# Patient Record
Sex: FEMALE | Race: ASIAN | NOT HISPANIC OR LATINO | Employment: OTHER | URBAN - METROPOLITAN AREA
[De-identification: names, ages, dates, MRNs, and addresses within clinical notes are randomized per-mention and may not be internally consistent; named-entity substitution may affect disease eponyms.]

---

## 2018-01-24 ENCOUNTER — AMBULATORY - HEALTHEAST (OUTPATIENT)
Dept: NURSING | Facility: CLINIC | Age: 43
End: 2018-01-24

## 2018-05-23 ENCOUNTER — OFFICE VISIT - HEALTHEAST (OUTPATIENT)
Dept: FAMILY MEDICINE | Facility: CLINIC | Age: 43
End: 2018-05-23

## 2018-05-23 DIAGNOSIS — T20.20XD PARTIAL THICKNESS BURN OF FACE, SUBSEQUENT ENCOUNTER: ICD-10-CM

## 2018-05-23 RX ORDER — ACETAMINOPHEN 325 MG/1
650 TABLET ORAL EVERY 6 HOURS PRN
Status: SHIPPED | COMMUNITY
Start: 2018-05-23

## 2018-05-23 ASSESSMENT — MIFFLIN-ST. JEOR: SCORE: 1127.16

## 2019-01-21 ENCOUNTER — OFFICE VISIT - HEALTHEAST (OUTPATIENT)
Dept: FAMILY MEDICINE | Facility: CLINIC | Age: 44
End: 2019-01-21

## 2019-01-21 DIAGNOSIS — Z12.4 SCREENING FOR CERVICAL CANCER: ICD-10-CM

## 2019-01-21 DIAGNOSIS — N89.8 VAGINAL ITCHING: ICD-10-CM

## 2019-01-21 DIAGNOSIS — Z11.3 SCREENING FOR STD (SEXUALLY TRANSMITTED DISEASE): ICD-10-CM

## 2019-01-21 DIAGNOSIS — Z23 NEED FOR IMMUNIZATION AGAINST INFLUENZA: ICD-10-CM

## 2019-01-21 LAB
CLUE CELLS: NORMAL
HIV 1+2 AB+HIV1 P24 AG SERPL QL IA: NEGATIVE
TRICHOMONAS, WET PREP: NORMAL
YEAST, WET PREP: NORMAL

## 2019-01-21 ASSESSMENT — MIFFLIN-ST. JEOR: SCORE: 1059.12

## 2019-01-22 ENCOUNTER — COMMUNICATION - HEALTHEAST (OUTPATIENT)
Dept: FAMILY MEDICINE | Facility: CLINIC | Age: 44
End: 2019-01-22

## 2019-01-22 LAB
C TRACH DNA SPEC QL PROBE+SIG AMP: NEGATIVE
HPV SOURCE: ABNORMAL
HUMAN PAPILLOMA VIRUS 16 DNA: POSITIVE
HUMAN PAPILLOMA VIRUS 18 DNA: NEGATIVE
HUMAN PAPILLOMA VIRUS FINAL DIAGNOSIS: ABNORMAL
HUMAN PAPILLOMA VIRUS OTHER HR: NEGATIVE
N GONORRHOEA DNA SPEC QL NAA+PROBE: NEGATIVE
SPECIMEN DESCRIPTION: ABNORMAL
T PALLIDUM AB SER QL: NEGATIVE

## 2019-01-29 LAB
BKR LAB AP ABNORMAL BLEEDING: NO
BKR LAB AP BIRTH CONTROL/HORMONES: ABNORMAL
BKR LAB AP CERVICAL APPEARANCE: NORMAL
BKR LAB AP GYN ADEQUACY: ABNORMAL
BKR LAB AP GYN INTERPRETATION: ABNORMAL
BKR LAB AP HPV REFLEX: ABNORMAL
BKR LAB AP LMP: ABNORMAL
BKR LAB AP PATIENT STATUS: ABNORMAL
BKR LAB AP PREVIOUS ABNORMAL: NO
BKR LAB AP PREVIOUS NORMAL: 2014
HIGH RISK?: NO
PATH REPORT.COMMENTS IMP SPEC: ABNORMAL
RESULT FLAG (HE HISTORICAL CONVERSION): ABNORMAL

## 2019-01-31 ENCOUNTER — AMBULATORY - HEALTHEAST (OUTPATIENT)
Dept: FAMILY MEDICINE | Facility: CLINIC | Age: 44
End: 2019-01-31

## 2019-01-31 DIAGNOSIS — R87.810 ATYPICAL SQUAMOUS CELL CHANGES OF UNDETERMINED SIGNIFICANCE (ASCUS) ON CERVICAL CYTOLOGY WITH POSITIVE HIGH RISK HUMAN PAPILLOMA VIRUS (HPV): ICD-10-CM

## 2019-01-31 DIAGNOSIS — R87.610 ATYPICAL SQUAMOUS CELLS OF UNDETERMINED SIGNIFICANCE ON CYTOLOGIC SMEAR OF CERVIX (ASC-US): ICD-10-CM

## 2019-01-31 DIAGNOSIS — R87.610 ATYPICAL SQUAMOUS CELL CHANGES OF UNDETERMINED SIGNIFICANCE (ASCUS) ON CERVICAL CYTOLOGY WITH POSITIVE HIGH RISK HUMAN PAPILLOMA VIRUS (HPV): ICD-10-CM

## 2019-02-14 ENCOUNTER — AMBULATORY - HEALTHEAST (OUTPATIENT)
Dept: OBGYN | Facility: CLINIC | Age: 44
End: 2019-02-14

## 2019-02-14 DIAGNOSIS — Z01.818 PRE-PROCEDURAL EXAMINATION: ICD-10-CM

## 2019-02-14 DIAGNOSIS — R87.610 ASCUS WITH POSITIVE HIGH RISK HPV CERVICAL: ICD-10-CM

## 2019-02-14 DIAGNOSIS — R87.810 ASCUS WITH POSITIVE HIGH RISK HPV CERVICAL: ICD-10-CM

## 2019-02-14 LAB
HCG UR QL: NEGATIVE
SP GR UR STRIP: 1.02 (ref 1–1.03)

## 2019-02-14 ASSESSMENT — MIFFLIN-ST. JEOR: SCORE: 1050.05

## 2019-02-19 LAB
LAB AP CHARGES (HE HISTORICAL CONVERSION): NORMAL
PATH REPORT.COMMENTS IMP SPEC: NORMAL
PATH REPORT.COMMENTS IMP SPEC: NORMAL
PATH REPORT.FINAL DX SPEC: NORMAL
PATH REPORT.GROSS SPEC: NORMAL
PATH REPORT.MICROSCOPIC SPEC OTHER STN: NORMAL
PATH REPORT.MICROSCOPIC SPEC OTHER STN: NORMAL
PATH REPORT.RELEVANT HX SPEC: NORMAL
RESULT FLAG (HE HISTORICAL CONVERSION): NORMAL

## 2019-03-22 ENCOUNTER — OFFICE VISIT - HEALTHEAST (OUTPATIENT)
Dept: FAMILY MEDICINE | Facility: CLINIC | Age: 44
End: 2019-03-22

## 2019-03-22 DIAGNOSIS — Z30.430 ENCOUNTER FOR IUD INSERTION: ICD-10-CM

## 2019-03-22 LAB — HCG UR QL: NEGATIVE

## 2019-03-22 ASSESSMENT — MIFFLIN-ST. JEOR: SCORE: 1063.66

## 2019-03-27 ENCOUNTER — OFFICE VISIT - HEALTHEAST (OUTPATIENT)
Dept: FAMILY MEDICINE | Facility: CLINIC | Age: 44
End: 2019-03-27

## 2019-03-27 DIAGNOSIS — Z30.431 IUD CHECK UP: ICD-10-CM

## 2019-03-27 ASSESSMENT — MIFFLIN-ST. JEOR: SCORE: 1072.73

## 2019-03-29 ENCOUNTER — HOSPITAL ENCOUNTER (OUTPATIENT)
Dept: ULTRASOUND IMAGING | Facility: HOSPITAL | Age: 44
Discharge: HOME | End: 2019-03-29
Attending: FAMILY MEDICINE

## 2019-03-29 DIAGNOSIS — Z30.431 IUD CHECK UP: ICD-10-CM

## 2019-04-12 ENCOUNTER — COMMUNICATION - HEALTHEAST (OUTPATIENT)
Dept: SCHEDULING | Facility: CLINIC | Age: 44
End: 2019-04-12

## 2019-04-12 ENCOUNTER — OFFICE VISIT - HEALTHEAST (OUTPATIENT)
Dept: FAMILY MEDICINE | Facility: CLINIC | Age: 44
End: 2019-04-12

## 2019-04-12 DIAGNOSIS — R11.2 NAUSEA AND VOMITING, INTRACTABILITY OF VOMITING NOT SPECIFIED, UNSPECIFIED VOMITING TYPE: ICD-10-CM

## 2019-04-12 LAB
ANION GAP SERPL CALCULATED.3IONS-SCNC: 9 MMOL/L (ref 5–18)
BASOPHILS # BLD AUTO: 0 THOU/UL (ref 0–0.2)
BASOPHILS NFR BLD AUTO: 0 % (ref 0–2)
BUN SERPL-MCNC: 6 MG/DL (ref 8–22)
CALCIUM SERPL-MCNC: 9.6 MG/DL (ref 8.5–10.5)
CHLORIDE BLD-SCNC: 109 MMOL/L (ref 98–107)
CO2 SERPL-SCNC: 23 MMOL/L (ref 22–31)
CREAT SERPL-MCNC: 0.66 MG/DL (ref 0.6–1.1)
EOSINOPHIL # BLD AUTO: 0.6 THOU/UL (ref 0–0.4)
EOSINOPHIL NFR BLD AUTO: 7 % (ref 0–6)
ERYTHROCYTE [DISTWIDTH] IN BLOOD BY AUTOMATED COUNT: 10.3 % (ref 11–14.5)
GFR SERPL CREATININE-BSD FRML MDRD: >60 ML/MIN/1.73M2
GLUCOSE BLD-MCNC: 98 MG/DL (ref 70–125)
HCG SERPL QL: NEGATIVE
HCT VFR BLD AUTO: 38.4 % (ref 35–47)
HGB BLD-MCNC: 12.8 G/DL (ref 12–16)
LYMPHOCYTES # BLD AUTO: 1.1 THOU/UL (ref 0.8–4.4)
LYMPHOCYTES NFR BLD AUTO: 12 % (ref 20–40)
MCH RBC QN AUTO: 32.7 PG (ref 27–34)
MCHC RBC AUTO-ENTMCNC: 33.4 G/DL (ref 32–36)
MCV RBC AUTO: 98 FL (ref 80–100)
MONOCYTES # BLD AUTO: 0.4 THOU/UL (ref 0–0.9)
MONOCYTES NFR BLD AUTO: 4 % (ref 2–10)
NEUTROPHILS # BLD AUTO: 7.5 THOU/UL (ref 2–7.7)
NEUTROPHILS NFR BLD AUTO: 78 % (ref 50–70)
PLATELET # BLD AUTO: 189 THOU/UL (ref 140–440)
PMV BLD AUTO: 9.1 FL (ref 7–10)
POTASSIUM BLD-SCNC: 4 MMOL/L (ref 3.5–5)
RBC # BLD AUTO: 3.92 MILL/UL (ref 3.8–5.4)
SODIUM SERPL-SCNC: 141 MMOL/L (ref 136–145)
WBC: 9.6 THOU/UL (ref 4–11)

## 2019-04-23 ENCOUNTER — OFFICE VISIT - HEALTHEAST (OUTPATIENT)
Dept: FAMILY MEDICINE | Facility: CLINIC | Age: 44
End: 2019-04-23

## 2019-04-23 DIAGNOSIS — H69.92 DYSFUNCTION OF LEFT EUSTACHIAN TUBE: ICD-10-CM

## 2019-04-23 DIAGNOSIS — Z30.431 IUD CHECK UP: ICD-10-CM

## 2019-04-23 RX ORDER — LORATADINE 10 MG/1
10 TABLET ORAL DAILY
Qty: 30 TABLET | Refills: 11 | Status: SHIPPED | OUTPATIENT
Start: 2019-04-23

## 2019-04-23 ASSESSMENT — MIFFLIN-ST. JEOR: SCORE: 1072.73

## 2019-04-25 ENCOUNTER — COMMUNICATION - HEALTHEAST (OUTPATIENT)
Dept: SCHEDULING | Facility: CLINIC | Age: 44
End: 2019-04-25

## 2019-08-07 ENCOUNTER — OFFICE VISIT - HEALTHEAST (OUTPATIENT)
Dept: FAMILY MEDICINE | Facility: CLINIC | Age: 44
End: 2019-08-07

## 2019-08-07 DIAGNOSIS — R21 RASH: ICD-10-CM

## 2019-08-07 ASSESSMENT — MIFFLIN-ST. JEOR: SCORE: 1085.43

## 2019-08-22 ENCOUNTER — AMBULATORY - HEALTHEAST (OUTPATIENT)
Dept: FAMILY MEDICINE | Facility: CLINIC | Age: 44
End: 2019-08-22

## 2019-08-22 DIAGNOSIS — L70.8 OTHER ACNE: ICD-10-CM

## 2019-08-22 RX ORDER — TRETINOIN 0.5 MG/G
CREAM TOPICAL DAILY
Qty: 45 G | Refills: 4 | Status: SHIPPED | OUTPATIENT
Start: 2019-08-22

## 2019-11-04 ENCOUNTER — COMMUNICATION - HEALTHEAST (OUTPATIENT)
Dept: SCHEDULING | Facility: CLINIC | Age: 44
End: 2019-11-04

## 2019-11-07 ENCOUNTER — OFFICE VISIT - HEALTHEAST (OUTPATIENT)
Dept: FAMILY MEDICINE | Facility: CLINIC | Age: 44
End: 2019-11-07

## 2019-11-07 ENCOUNTER — COMMUNICATION - HEALTHEAST (OUTPATIENT)
Dept: TELEHEALTH | Facility: CLINIC | Age: 44
End: 2019-11-07

## 2019-11-07 DIAGNOSIS — H52.13 MYOPIA, BILATERAL: ICD-10-CM

## 2019-11-07 DIAGNOSIS — R20.2 TINGLING: ICD-10-CM

## 2019-11-07 DIAGNOSIS — Z23 NEED FOR VACCINATION: ICD-10-CM

## 2019-11-07 LAB
CHOLEST SERPL-MCNC: 173 MG/DL
FASTING STATUS PATIENT QL REPORTED: NORMAL
HDLC SERPL-MCNC: 51 MG/DL
LDLC SERPL CALC-MCNC: 109 MG/DL
TRIGL SERPL-MCNC: 67 MG/DL
TSH SERPL DL<=0.005 MIU/L-ACNC: 0.61 UIU/ML (ref 0.3–5)
VIT B12 SERPL-MCNC: 289 PG/ML (ref 213–816)

## 2019-11-07 ASSESSMENT — MIFFLIN-ST. JEOR: SCORE: 1099.95

## 2019-11-08 ENCOUNTER — COMMUNICATION - HEALTHEAST (OUTPATIENT)
Dept: FAMILY MEDICINE | Facility: CLINIC | Age: 44
End: 2019-11-08

## 2020-08-21 ENCOUNTER — COMMUNICATION - HEALTHEAST (OUTPATIENT)
Dept: SCHEDULING | Facility: CLINIC | Age: 45
End: 2020-08-21

## 2021-04-15 ENCOUNTER — AMBULATORY - HEALTHEAST (OUTPATIENT)
Dept: NURSING | Facility: CLINIC | Age: 46
End: 2021-04-15

## 2021-05-06 ENCOUNTER — AMBULATORY - HEALTHEAST (OUTPATIENT)
Dept: NURSING | Facility: CLINIC | Age: 46
End: 2021-05-06

## 2021-05-25 ENCOUNTER — RECORDS - HEALTHEAST (OUTPATIENT)
Dept: ADMINISTRATIVE | Facility: CLINIC | Age: 46
End: 2021-05-25

## 2021-05-26 NOTE — PROGRESS NOTES
IUD Insertion Procedure Note    Pre-operative Diagnosis: desires contraception    Post-operative Diagnosis: same    Indications: contraception    Procedure Details   Urine pregnancy test was done today and result was negative.  The risks (including infection, bleeding, pain, and uterine perforation) and benefits of the procedure were explained to the patient and Written informed consent was obtained.      Cervix cleansed with Betadine. Uterus sounded to 7 cm. IUD inserted without difficulty. String visible and trimmed. Patient tolerated procedure well.    IUD Information:  Meena.Lot#: [id number] Exp: 07/2024 Ndc: 11165-284-04    Condition:  Stable    Complications:  None    Plan:    The patient was advised to call for any fever or for prolonged or severe pain or bleeding. She was advised to use OTC ibuprofen as needed for mild to moderate pain.

## 2021-05-27 ENCOUNTER — RECORDS - HEALTHEAST (OUTPATIENT)
Dept: ADMINISTRATIVE | Facility: CLINIC | Age: 46
End: 2021-05-27

## 2021-05-27 NOTE — TELEPHONE ENCOUNTER
Patient had IUD placed on the 22nd of March, 2019  She is c/o   Vomiting, and really weak, and cannot   Hear because of ringing in her ears  She is having a hard time walking without help from another  Twice yesterday and twice  Today.    She has her period at this time, heavier than normal.  Not a lot of cramping , just more bleeding.    Patient was scheduled at [facility] , and then called back and states they may check out the [facility] , and if wait times are not too long, she will go there , and cancel the C appointment.    Sarah Duran RN  [facility] Triage/refill nurse

## 2021-05-27 NOTE — PROGRESS NOTES
ASSESSMENT/PLAN:    1. IUD check up  Patient had change sensation starting last night, then 4 days after ParaGard IUD placement.  Her exam today is normal.  I given her ibuprofen prior to the exam is a assumed likely the IUD had become displaced and was planning on removing it.  However, she has normal physical exam.  However, given that she is having some cramping and this feels significantly different than what was first placed, I recommend checking pelvic ultrasound to ensure that IUD is properly positioned to avoid unintended pregnancy.  - US Pelvis With Transvaginal Non OB; Future  - ibuprofen tablet 600 mg (ADVIL,MOTRIN)      Return in 4 weeks (on 4/23/2019) for with your primary care doctor, Next scheduled appointment.     SUBJECTIVE:  Leti Steele is a 43 y.o. female here concerned about her ParaGard IUD.  Patient had uneventful placement of ParaGard IUD by Dr. Joseph on 3/22/2019 (5 days ago.  Initially things seemed fine, but then starting yesterday she had some discomfort when she was sitting a certain way and it persisted overnight.  While she was in the shower she tried to feel the strings and they seem longer than she remembered and so she was worried that that IUD was becoming misplaced and wanted to be sure to avoid unintentional pregnancy.  She is having a little cramping when she sits a certain way, but is not bad.  No spotting or bleeding.         The following portions of the patient's history were reviewed and updated as appropriate: allergies, current medications and problem list  Current Outpatient Medications on File Prior to Visit   Medication Sig Dispense Refill     acetaminophen (TYLENOL) 325 MG tablet Take 650 mg by mouth every 6 (six) hours as needed for pain.       copper (PARAGARD T 380A UTRN) by Intrauterine route. Lot#: [id number]  Exp: 07/2024  Ndc: 60754-703-93             No current facility-administered medications on file prior to visit.          Social History     Tobacco Use  "  Smoking Status Never Smoker   Smokeless Tobacco Never Used       OBJECTIVE:  :  BP 90/60   Pulse 72   Temp 98.6  F (37  C) (Oral)   Resp 24   Ht 4' 11\" (1.499 m)   Wt 114 lb (51.7 kg)   LMP 03/17/2019   SpO2 96%   BMI 23.03 kg/m    Wt Readings from Last 3 Encounters:   03/27/19 114 lb (51.7 kg)   03/22/19 112 lb (50.8 kg)   02/14/19 109 lb (49.4 kg)         Gen:  A&A, NAD  : Normal female external genitalia.  Hemorrhoids noted.  Speculum exam reveals normal-appearing cervix.  There are white IUD strings coming from the cervical loss that are about 3-4 cm in length.  I do not see the IUD device itself at all coming from the office.    "

## 2021-05-28 ENCOUNTER — RECORDS - HEALTHEAST (OUTPATIENT)
Dept: ADMINISTRATIVE | Facility: CLINIC | Age: 46
End: 2021-05-28
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
## 2021-05-29 ENCOUNTER — RECORDS - HEALTHEAST (OUTPATIENT)
Dept: ADMINISTRATIVE | Facility: CLINIC | Age: 46
End: 2021-05-29

## 2021-05-30 ENCOUNTER — RECORDS - HEALTHEAST (OUTPATIENT)
Dept: ADMINISTRATIVE | Facility: CLINIC | Age: 46
End: 2021-05-30

## 2021-05-31 NOTE — PROGRESS NOTES
"Assessment:       1. Rash         Medical Decision Making  Rash due to unknown cause. Suspected reactive to Paragard IUD. No signs of infection such as fevers, nausea, and/or vomiting. Rash has been affecting the face only for 3 months. Does not appear to be impetigo given lack of honey-colored crusts. No purulent discharge. Also does not appear to be a contact dermatitis with no new soaps, lotions, or detergents.      Plan:       Calamine lotion.  Cold compresses.  Follow-up with PCP for further evaluation and possible IUD removal.  Discussed signs of worsening symptoms and when to return for immediate re-evaluation.       Patient Instructions   You were seen today for a facial rash. It does not appear infectious. Recommend follow-up with primary care for further evaluation.    Recommend trying topical calamine lotion.         Subjective:        Leti Steele is a 44 y.o. female here for evaluation of a rash. Symptoms have been present for 3 months. The rash is located on the face. Since then it has not spread. Parts of the rash will heal and then new bumps will reappear. Patient has tried nothing for initial treatment and the rash has worsened. Discomfort is mild, described as itchiness. Patient does not have a fever. Recent illnesses: none. Sick contacts: none known. New recent exposures to food, detergent, soaps: none. Patient's symptoms began after she had th Paragard IUD inserted and is concerned this is causing her symptoms.  She has friends who have had similar reactions to the same contraceptive. Patient tried making an appointment with her PCP, but the soonest available was 8/19.    Review of Systems  Pertinent items are noted in HPI.    Allergies  Allergies   Allergen Reactions     No Known Drug Allergies           Objective:       BP 96/50 (Patient Site: Right Arm, Patient Position: Sitting, Cuff Size: Adult Regular)   Pulse 64   Temp 98.4  F (36.9  C) (Oral)   Ht 4' 11\" (1.499 m)   Wt 116 lb 12.8 oz " (53 kg)   SpO2 98%   BMI 23.59 kg/m    General appearance: alert, appears stated age, cooperative, no distress and non-toxic   Throat: no tonsil swelling, erythema, or exudate; MMM, lips and tongue normal  Neck: no lymphadenopathy, supple  Skin: papular rash with occasional central scabs that are healing affecting the face; spares the lips, eyes, and ears

## 2021-05-31 NOTE — PATIENT INSTRUCTIONS - HE
You were seen today for a facial rash. It does not appear infectious. Recommend follow-up with primary care for further evaluation.    Recommend trying topical calamine lotion.

## 2021-06-01 VITALS — WEIGHT: 126 LBS | BODY MASS INDEX: 25.4 KG/M2 | HEIGHT: 59 IN

## 2021-06-02 ENCOUNTER — RECORDS - HEALTHEAST (OUTPATIENT)
Dept: ADMINISTRATIVE | Facility: CLINIC | Age: 46
End: 2021-06-02

## 2021-06-02 VITALS — BODY MASS INDEX: 22.38 KG/M2 | HEIGHT: 59 IN | WEIGHT: 111 LBS

## 2021-06-02 VITALS — WEIGHT: 114 LBS | BODY MASS INDEX: 22.98 KG/M2 | HEIGHT: 59 IN

## 2021-06-02 VITALS — WEIGHT: 109 LBS | BODY MASS INDEX: 21.97 KG/M2 | HEIGHT: 59 IN

## 2021-06-02 VITALS — WEIGHT: 112 LBS | BODY MASS INDEX: 22.58 KG/M2 | HEIGHT: 59 IN

## 2021-06-02 VITALS — WEIGHT: 115 LBS | BODY MASS INDEX: 23.23 KG/M2

## 2021-06-03 ENCOUNTER — RECORDS - HEALTHEAST (OUTPATIENT)
Dept: ADMINISTRATIVE | Facility: CLINIC | Age: 46
End: 2021-06-03

## 2021-06-03 VITALS
HEIGHT: 59 IN | OXYGEN SATURATION: 99 % | RESPIRATION RATE: 20 BRPM | HEART RATE: 66 BPM | BODY MASS INDEX: 24.19 KG/M2 | TEMPERATURE: 98 F | WEIGHT: 120 LBS | SYSTOLIC BLOOD PRESSURE: 108 MMHG | DIASTOLIC BLOOD PRESSURE: 70 MMHG

## 2021-06-03 VITALS — HEIGHT: 59 IN | BODY MASS INDEX: 23.55 KG/M2 | WEIGHT: 116.8 LBS
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
## 2021-06-03 NOTE — TELEPHONE ENCOUNTER
Finger and toes on left side are tingling  Whole hand on left side   Has a headache , and all of these symptoms began at midnight last night.  Per protocol, patient was advised to be seen in the ED @ [place].    Sarah Duran RN  [facility] Triage/refill nurse        Reason for Disposition    New neurologic deficit that is present NOW, sudden onset of ANY of the following: * Weakness of the face, arm, or leg on one side of the body * Numbness of the face, arm, or leg on one side of the body * Loss of speech or garbled speech    Headache (with neurologic deficit)    Can't use hand normally (e.g., hold a glass of water)    Back pain with numbness (loss of sensation) in groin or rectal area    Neurologic deficit that was brief (now gone), ANY of the following: * Weakness of the face, arm, or leg on one side of the body * Numbness of the face, arm, or leg on one side of the body * Loss of speech or garbled speech    Neurologic deficit of gradual onset, ANY of the following: * Weakness of the face, arm, or leg on one side of the body * Numbness of the face, arm, or leg on one side of the body * Loss of speech or garbled speech    Tingling (e.g., pins and needles) of the face, arm or leg on one side of the body, that is  present now    Protocols used: NEUROLOGIC DEFICIT-A-OH

## 2021-06-05 ENCOUNTER — RECORDS - HEALTHEAST (OUTPATIENT)
Dept: FAMILY MEDICINE | Facility: CLINIC | Age: 46
End: 2021-06-05

## 2021-06-05 DIAGNOSIS — O48.0 POST TERM PREGNANCY: ICD-10-CM

## 2021-06-10 NOTE — TELEPHONE ENCOUNTER
Rahul, , calling with patient.  Patient reports that at 2 am, she woke up because she had severe chest pain.   Patient reported constant pain and was severe for while but she didn't want to go to the ED due to COVID-19.  Patient says the pain has lessened over time but she still feels the pain intermittent.  Patient rates current chest pain as 3-4/10 and it lasts less than 5 min.  Patient also reports back pain.  Patient says has pain in her left arm sometimes when the pain is present.  Reviewed care advice with caller per RN triage protocol to be evaluated.  Caller verbalized understanding and agrees.      Reason for Disposition    Pain also present in shoulder(s) or arm(s) or jaw  (Exception: pain is clearly made worse by movement)    Additional Information    Negative: Severe difficulty breathing (e.g., struggling for each breath, speaks in single words)    Negative: Difficult to awaken or acting confused (e.g., disoriented, slurred speech)    Negative: Shock suspected (e.g., cold/pale/clammy skin, too weak to stand, low BP, rapid pulse)    Negative: [1] Chest pain lasts > 5 minutes AND [2] history of heart disease  (i.e., heart attack, bypass surgery, angina, angioplasty, CHF; not just a heart murmur)    Negative: [1] Chest pain lasts > 5 minutes AND [2] described as crushing, pressure-like, or heavy    Negative: [1] Chest pain lasts > 5 minutes AND [2] age > 50    Negative: [1] Chest pain lasts > 5 minutes AND [2] age > 30 AND [3] at least one cardiac risk factor (i.e., hypertension, diabetes, obesity, smoker or strong family history of heart disease)    Negative: [1] Chest pain lasts > 5 minutes AND [2] not relieved with nitroglycerin    Negative: Passed out (i.e., lost consciousness, collapsed and was not responding)    Negative: Heart beating < 50 beats per minute OR > 140 beats per minute    Negative: Visible sweat on face or sweat dripping down face    Negative: Sounds like a life-threatening  "emergency to the triager    Negative: Followed a chest injury    Negative: SEVERE chest pain    Negative: [1] Intermittent  chest pain or \"angina\" AND [2] increasing in severity or frequency  (Exception: pains lasting a few seconds)    Protocols used: CHEST PAIN-A-AH      "

## 2021-06-16 PROBLEM — T20.20XA PARTIAL THICKNESS BURN OF FACE: Status: ACTIVE | Noted: 2018-05-23

## 2021-06-16 PROBLEM — L70.9 ACNE: Status: ACTIVE | Noted: 2019-08-22

## 2021-06-16 PROBLEM — R20.2 PARESTHESIAS: Status: ACTIVE | Noted: 2019-11-04

## 2021-06-16 PROBLEM — N87.0 DYSPLASIA OF CERVIX, LOW GRADE (CIN 1): Status: ACTIVE | Noted: 2019-06-07

## 2021-06-16 PROBLEM — Z30.430 ENCOUNTER FOR IUD INSERTION: Status: ACTIVE | Noted: 2019-03-22

## 2021-06-17 NOTE — PATIENT INSTRUCTIONS - HE
Patient Instructions by Xenia Yuan at 4/12/2019  9:50 AM     Author: Xenia Yuan Service: -- Author Type: Medical Student    Filed: 4/12/2019 10:53 AM Encounter Date: 4/12/2019 Status: Addendum    : Xenia Yuan (Medical Student)    Related Notes: Original Note by Xenia Yuan (Medical Student) filed at 4/12/2019 10:48 AM       Patient Education   You have your period which can happen even if you have an IUD. I suspect you also have a viral GI illness. Please review the following information and follow up if symptoms worsen or fail to improve.  Viral Gastroenteritis (Adult)    Gastroenteritis is commonly called the stomach flu. It is most often caused by a virus that affects the stomach and intestinal tract and usually lasts from 2 to 7 days. Common viruses causing gastroenteritis include norovirus, rotavirus, and hepatitis A. Non-viral causes of gastroenteritis include bacteria, parasites, and toxins.  The danger from repeated vomiting or diarrhea is dehydration. This is the loss of too much fluid from the body. When this occurs, body fluids must be replaced. Antibiotics do not help with this illness because it is usually viral.Simple home treatment will be helpful.  Symptoms of viral gastroenteritis may include:    Watery, loose stools    Stomach pain or abdominal cramps    Fever and chills    Nausea and vomiting    Loss of bowel control    Headache  Home care  Gastroenteritis is transmitted by contact with the stool or vomit of an infected person. This can occur from person to person or from contact with a contaminated surface.  Follow these guidelines when caring for yourself at home:    If symptoms are severe, rest at home for the next 24 hours or until you are feeling better.    Wash your hands with soap and water or use alcohol-based  to prevent the spread of infection. Wash your hands after touching anyone who is sick.    Wash your hands or use alcohol-based  after using the  toilet and before meals. Clean the toilet after each use.  Remember these tips when preparing food:    People with diarrhea should not prepare or serve food to others. When preparing foods, wash your hands before and after.    Wash your hands after using cutting boards, countertops, knives, or utensils that have been in contact with raw food.    Keep uncooked meats away from cooked and ready-to-eat foods.  Medicine  You may use acetaminophen or NSAID medicines like ibuprofen or naproxen to control fever unless another medicine was given. If you have chronic liver or kidney disease, talk with your healthcare provider before using these medicines. Also talk with your provider if you've had a stomach ulcer or gastrointestinal bleeding. Don't give aspirin to anyone under 18 years of age who is ill with a fever. It may cause severe liver damage. Don't use NSAIDS is you are already taking one for another condition (like arthritis) or are on aspirin (such as for heart disease or after a stroke).  If medicine for vomiting or diarrhea are prescribed, take these only as directed. Do not take over-the-counter medicines for vomiting or diarrhea unless instructed by your healthcare provider.  Diet  Follow these guidelines for food:    Water and liquids are important so you don't get dehydrated. Drink a small amount at a time or suck on ice chips if you are vomiting.    If you eat, avoid fatty, greasy, spicy, or fried foods.    Don't eat dairy if you have diarrhea. This can make diarrhea worse.    Avoid tobacco, alcohol, and caffeine which may worsen symptoms.  During the first 24 hours (the first full day), follow the diet below:    Beverages. Sports drinks, soft drinks without caffeine, ginger ale, mineral water (plain or flavored), decaffeinated tea and coffee. If you are very dehydrated, sports drinks aren't a good choice. They have too much sugar and not enough electrolytes. In this case, commercially available products  called oral rehydration solutions, are best.    Soups. Eat clear broth, consommé, and bouillon.    Desserts. Eat gelatin, popsicles, and fruit juice bars.  During the next 24 hours (the second day), you may add the following to the above:    Hot cereal, plain toast, bread, rolls, and crackers    Plain noodles, rice, mashed potatoes, chicken noodle or rice soup    Unsweetened canned fruit (avoid pineapple), bananas    Limit fat intake to less than 15 grams per day. Do this by avoiding margarine, butter, oils, mayonnaise, sauces, gravies, fried foods, peanut butter, meat, poultry, and fish.    Limit fiber and avoid raw or cooked vegetables, fresh fruits (except bananas), and bran cereals.    Limit caffeine and chocolate. Don't use spices or seasonings other than salt.    Limit dairy products.    Avoid alcohol.  During the next 24 hours:    Gradually resume a normal diet as you feel better and your symptoms improve.    If at any time it starts getting worse again, go back to clear liquids until you feel better.  Follow-up care  Follow up with your healthcare provider, or as advised. Call your provider if you don't get better within 24 hours or if diarrhea lasts more than a week. Also follow up if you are unable to keep down liquids and get dehydrated. If a stool (diarrhea) sample was taken, call as directed for the results.  Call 911  Call 911 if any of these occur:    Trouble breathing    Chest pain    Confused    Severe drowsiness or trouble awakening    Fainting or loss of consciousness    Rapid heart rate    Seizure    Stiff neck  When to seek medical advice  Call your healthcare provider right away if any of these occur:    Abdominal pain that gets worse    Continued vomiting (unable to keep liquids down)    Frequent diarrhea (more than 5 times a day)    Blood in vomit or stool (black or red color)    Dark urine, reduced urine output, or extreme thirst    Weakness or dizziness    Drowsiness    Fever of 100.4 F  (38 C) or higher, or as directed by your healthcare provider    New rash  Date Last Reviewed: 1/3/2016    1488-4991 [employer], [employer]. [address] [place] [address], [place] [address]. All rights reserved. This information is not intended as a substitute for professional medical care. Always follow your healthcare professional's instructions.
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
## 2021-06-18 NOTE — LETTER
Letter by Desirae Joseph MD at      Author: Desirae Joseph MD Service: -- Author Type: --    Filed:  Encounter Date: 1/22/2019 Status: (Other)               [address]  [place] [address]   PHONE [phone]  [phone]     January 22, 2019  Leti JENNINGS Cedric  [id number] [address] [place] [id number]    Dear Leti:    Below are the results from your recent visit.  Your results are normal.      Resulted Orders   Wet Prep, Vaginal   Result Value Ref Range    Yeast Result No yeast seen No yeast seen    Trichomonas No Trichomonas seen No Trichomonas seen    Clue Cells, Wet Prep No Clue cells seen No Clue cells seen   Chlamydia trachomatis & Neisseria gonorrhoeae, Amplified Detection   Result Value Ref Range    Chlamydia trachomatis, Amplified Detection Negative Negative    Neisseria gonorrhoeae, Amplified Detection Negative Negative   HIV Antigen/Antibody Screening [place]   Result Value Ref Range    HIV Antigen / Antibody Negative Negative    Narrative    Method is Abbott HIV Ag/Ab for the detection of HIV p24 antigen, HIV-1 antibodies and HIV-2 antibodies.   Syphilis Screen, [place]   Result Value Ref Range    Treponema Antibody (Syphilis) Negative Negative         If you have any questions or concerns, please do not hesitate to call.    Sincerely,      Desirae Joseph MD  January 22, 2019

## 2021-06-19 NOTE — LETTER
Letter by Desirae Joseph MD at      Author: Desirae Joseph MD Service: -- Author Type: --    Filed:  Encounter Date: 11/8/2019 Status: Signed               [address]  [place] [address]   PHONE [phone]  [phone]     November 8, 2019  Leti JENNINGS Cedric  [id number] [address] [address]    Dear Leti:    Below are the results from your recent visit.  Your results are normal.      Resulted Orders   Thyroid Cascade   Result Value Ref Range    TSH 0.61 0.30 - 5.00 uIU/mL   Vitamin B12   Result Value Ref Range    Vitamin B-12 289 213 - 816 pg/mL   Lipid Profile   Result Value Ref Range    Triglycerides 67 <=149 mg/dL    Cholesterol 173 <=199 mg/dL    LDL Calculated 109 <=129 mg/dL    HDL Cholesterol 51 >=50 mg/dL    Patient Fasting > 8hrs? Unknown          If you have any questions or concerns, please do not hesitate to call.    Sincerely,      Desirae Joseph MD  November 8, 2019
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
## 2021-06-27 NOTE — PROGRESS NOTES
Progress Notes by Domonique Ashraf NP at 2019  9:50 AM     Author: Domonique Ashraf NP Service: -- Author Type: Nurse Practitioner    Filed: 2019  1:42 PM Encounter Date: 2019 Status: Signed    : Domonique Ashraf NP (Nurse Practitioner)       Subjective:      Patient ID: Leti Steele is a 44 y.o. female.    Chief Complaint:    Patient is a 44 year-old female accompanied by her  with complaints of heavy vaginal bleeding for 3-4 days, lower abdominal cramping, nausea/vomiting since yesterday, weakness and dizziness. Patient has had no sick contacts that she is aware of. She denies fever. She denies cold symptoms. Patient reports she  Has an IUD placed 3 weeks ago and 3 days ago developed vaginal bleeding. She went through 6 pads yesterday and 2 today. She developed nausea and vomiting yesterday with 2 episodes of emesis yesterday and 2 episodes today. She has not had any food and little fluid intake. Patient has abdominal cramping but no constant abdominal pain. She has not taken any thing for these symptoms.         Past Medical History:   Diagnosis Date   ? Lyme disease    ? Oligohydramnios 2016   ? Sprain of sacroiliac ligament        Past Surgical History:   Procedure Laterality Date   ?  SECTION      for fetal anomalies   ? NM  DELIVERY ONLY      Description:  Section;  Recorded: 2014;       Family History   Problem Relation Age of Onset   ? No Medical Problems Mother    ? No Medical Problems Father    ? No Medical Problems Sister    ? No Medical Problems Brother    ? No Medical Problems Daughter    ? No Medical Problems Son    ? No Medical Problems Maternal Aunt    ? No Medical Problems Maternal Uncle    ? No Medical Problems Paternal Aunt    ? No Medical Problems Paternal Uncle    ? No Medical Problems Maternal Grandmother    ? No Medical Problems Maternal Grandfather    ? No Medical Problems Paternal Grandmother    ? No Medical Problems  Paternal Grandfather        Social History     Tobacco Use   ? Smoking status: Never Smoker   ? Smokeless tobacco: Never Used   Substance Use Topics   ? Alcohol use: No   ? Drug use: No     Allergies   Allergen Reactions   ? No Known Drug Allergies        Review of Systems   Constitutional: Positive for activity change, appetite change and fatigue. Negative for chills and fever.   HENT: Negative for ear pain.         Ringing in ears after vomiting   Eyes: Negative.    Respiratory: Negative.    Cardiovascular: Negative.    Gastrointestinal: Positive for nausea.   Genitourinary: Positive for vaginal bleeding.   Neurological: Positive for dizziness, weakness and light-headedness.       Objective:     /72   Pulse 64   Temp 98  F (36.7  C) (Oral)   Resp 18   Wt 115 lb (52.2 kg)   LMP 03/17/2019   SpO2 98%   BMI 23.23 kg/m      Physical Exam   Constitutional: She is oriented to person, place, and time. She appears well-developed.   HENT:   Head: Normocephalic.   Right Ear: Ear canal normal. A middle ear effusion is present.   Left Ear: Hearing, tympanic membrane, external ear and ear canal normal.   Ears:    Eyes: Pupils are equal, round, and reactive to light. Conjunctivae and EOM are normal.   Neck: Normal range of motion.   Cardiovascular: Normal rate, regular rhythm, normal heart sounds and intact distal pulses. Exam reveals no gallop and no friction rub.   No murmur heard.  Pulmonary/Chest: Effort normal. No respiratory distress. She has no wheezes. She has no rales. She exhibits no tenderness.   Abdominal: Soft. She exhibits no distension. There is no tenderness.   Genitourinary: Pelvic exam was performed with patient supine. Cervix exhibits no motion tenderness. There is bleeding in the vagina. No tenderness in the vagina.       Neurological: She is alert and oriented to person, place, and time. No cranial nerve deficit. Coordination normal.   Skin: Skin is warm. No rash noted. No erythema.    Psychiatric: She has a normal mood and affect.       Assessment:   1. Nausea and Vomiting  2. Vaginal bleeding.   Labs CBC and chem-7 were relatively normal. Pregnancy test negative. Menstrual bleeding that is common with IUD. Her IUD was intact since we could visualize the strings on exam. The vomiting is likely a gastrointestinal virus.  Recent Results (from the past 24 hour(s))   Basic Metabolic Panel   Result Value Ref Range    Sodium 141 136 - 145 mmol/L    Potassium 4.0 3.5 - 5.0 mmol/L    Chloride 109 (H) 98 - 107 mmol/L    CO2 23 22 - 31 mmol/L    Anion Gap, Calculation 9 5 - 18 mmol/L    Glucose 98 70 - 125 mg/dL    Calcium 9.6 8.5 - 10.5 mg/dL    BUN 6 (L) 8 - 22 mg/dL    Creatinine 0.66 0.60 - 1.10 mg/dL    GFR MDRD Af Amer >60 >60 mL/min/1.73m2    GFR MDRD Non Af Amer >60 >60 mL/min/1.73m2   HM1 (CBC with Diff)   Result Value Ref Range    WBC 9.6 4.0 - 11.0 thou/uL    RBC 3.92 3.80 - 5.40 mill/uL    Hemoglobin 12.8 12.0 - 16.0 g/dL    Hematocrit 38.4 35.0 - 47.0 %    MCV 98 80 - 100 fL    MCH 32.7 27.0 - 34.0 pg    MCHC 33.4 32.0 - 36.0 g/dL    RDW 10.3 (L) 11.0 - 14.5 %    Platelets 189 140 - 440 thou/uL    MPV 9.1 7.0 - 10.0 fL    Neutrophils % 78 (H) 50 - 70 %    Lymphocytes % 12 (L) 20 - 40 %    Monocytes % 4 2 - 10 %    Eosinophils % 7 (H) 0 - 6 %    Basophils % 0 0 - 2 %    Neutrophils Absolute 7.5 2.0 - 7.7 thou/uL    Lymphocytes Absolute 1.1 0.8 - 4.4 thou/uL    Monocytes Absolute 0.4 0.0 - 0.9 thou/uL    Eosinophils Absolute 0.6 (H) 0.0 - 0.4 thou/uL    Basophils Absolute 0.0 0.0 - 0.2 thou/uL   Beta-hCG, Qualitative, Serum   Result Value Ref Range    Beta hCG Qualitative Negative Negative         Recent Results (from the past 24 hour(s))   Basic Metabolic Panel   Result Value Ref Range    Sodium 141 136 - 145 mmol/L    Potassium 4.0 3.5 - 5.0 mmol/L    Chloride 109 (H) 98 - 107 mmol/L    CO2 23 22 - 31 mmol/L    Anion Gap, Calculation 9 5 - 18 mmol/L    Glucose 98 70 - 125 mg/dL    Calcium  9.6 8.5 - 10.5 mg/dL    BUN 6 (L) 8 - 22 mg/dL    Creatinine 0.66 0.60 - 1.10 mg/dL    GFR MDRD Af Amer >60 >60 mL/min/1.73m2    GFR MDRD Non Af Amer >60 >60 mL/min/1.73m2   HM1 (CBC with Diff)   Result Value Ref Range    WBC 9.6 4.0 - 11.0 thou/uL    RBC 3.92 3.80 - 5.40 mill/uL    Hemoglobin 12.8 12.0 - 16.0 g/dL    Hematocrit 38.4 35.0 - 47.0 %    MCV 98 80 - 100 fL    MCH 32.7 27.0 - 34.0 pg    MCHC 33.4 32.0 - 36.0 g/dL    RDW 10.3 (L) 11.0 - 14.5 %    Platelets 189 140 - 440 thou/uL    MPV 9.1 7.0 - 10.0 fL    Neutrophils % 78 (H) 50 - 70 %    Lymphocytes % 12 (L) 20 - 40 %    Monocytes % 4 2 - 10 %    Eosinophils % 7 (H) 0 - 6 %    Basophils % 0 0 - 2 %    Neutrophils Absolute 7.5 2.0 - 7.7 thou/uL    Lymphocytes Absolute 1.1 0.8 - 4.4 thou/uL    Monocytes Absolute 0.4 0.0 - 0.9 thou/uL    Eosinophils Absolute 0.6 (H) 0.0 - 0.4 thou/uL    Basophils Absolute 0.0 0.0 - 0.2 thou/uL       Plan:     1. Nausea and vomiting, , unspecified vomiting type  2. Vaginal Bleeding  Patient was given  ondansetron tablet 4 mg (ZOFRAN) in the clinic which improved her nausea. We did draw labs which were normal. Patient was informed that bleeding is common with IUD but continue close observation. She was encouraged to increase fluid intake related to her nausea and vomiting and gradually progress to a regular diet. She was given information about handwashing. She was given information about red flag signs and symptoms and when to seek emergent care such as blood in the vomit, confusion, and increased shortness of breath. Patient should return if bleeding continues or vomiting continues. Patient and  verbalized understanding and all questions were answered.        Office visit and charting completed with [facility] DNP-FNP student Xenia Yuan

## 2021-06-27 NOTE — PROGRESS NOTES
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  Pertinent past history includes no abnormal Pap smears.    Procedure Details   /50   Pulse 60   Ht 4' 11\" (1.499 m)   Wt 109 lb (49.4 kg)   LMP 01/17/2019 (Exact Date)   Body mass index is 22.02 kg/m .    The reason for procedure is explained, and informed consent is obtained.  Urine hCG is negative.    Speculum is placed in the vagina and visualization of cervix is achieved. The cervix is swabbed with 3% acetic acid solution. Transformation zone is easily seen.  Green filter is applied with no abnormal vessels seen.  Acetowhite epithelium is seen at 5:00-7:00.  Biopsy is taken at 7:00.  ECC is not done.  Adequate colposcopy.  The patient tolerated the procedure well.        Impression: ARMIDA I-II    Plan: Post procedure instructions are reviewed.  She will be notified when the biopsy results are available.  The role of HPV in causing cervical pap smear abnormalities, dysplasia, and cancer is reviewed with the patient.          "

## 2021-08-19 NOTE — TELEPHONE ENCOUNTER
CMT spoke with the patient's  (ELLIS on file and current). He was advised per physician that her dizziness and N/V not likely related to her IUD as it has been in place for some time now.     Rahul is concerned about Leti. He states that she had improved since her visit to [facility] 4/12 and now having vomiting today and not feeling well.     [facility] will forward message to provider as requested by patient's spouse. Spouse wanting to know what to do now?   
Call from     CC:  Spells / episodes of dizzy and vomiting     > Occurring over the past few days    > Not consistent - just occasional   > Will find herself having to steady herself when walking at times      > No cold / flu sx   > HA a little bit     > No fever     > No other sx reported     She is wondering if can attribute it to the IUD     I will message provider for clarification and will have them follow up with you      Tele# [phone]       Tima Springer, RN   Triage and Medication Refills        Reason for Disposition    Lightheadedness (dizziness) present now, after 2 hours of rest and fluids    Protocols used: DIZZINESS-A-OH      
It is unlikely to be due to an IUD, as she has had the iud for some time.  It is more likely to be due to an illness.    
Please see message below. Thanks.   
Asthma flare

## 2021-11-10 ENCOUNTER — IMMUNIZATION (OUTPATIENT)
Dept: NURSING | Facility: CLINIC | Age: 46
End: 2021-11-10
Payer: COMMERCIAL

## 2021-11-10 PROCEDURE — 0004A PR COVID VAC PFIZER DIL RECON 30 MCG/0.3 ML IM: CPT | Performed by: FAMILY MEDICINE

## 2021-11-10 PROCEDURE — 91300 PR COVID VAC PFIZER DIL RECON 30 MCG/0.3 ML IM: CPT | Performed by: FAMILY MEDICINE

## 2023-12-18 ENCOUNTER — TELEPHONE (OUTPATIENT)
Dept: FAMILY MEDICINE | Facility: CLINIC | Age: 48
End: 2023-12-18
Payer: COMMERCIAL

## 2023-12-18 ENCOUNTER — NURSE TRIAGE (OUTPATIENT)
Dept: FAMILY MEDICINE | Facility: CLINIC | Age: 48
End: 2023-12-18
Payer: COMMERCIAL

## 2023-12-18 NOTE — TELEPHONE ENCOUNTER
Called pt in an attempt to triage and assist in the Covid treatment protocol. Unable to triage pt as her  answered phone and he is not with pt. Per chart review, pt does not qualify for RN treatment plan. Pt has not been seen in clinic in over 2 years. Appt scheduled for 12/20. Also transferred pt's  to central scheduling to see if they can be seen sooner than 12/20.      Yann García, BSN RN  [facility]

## 2023-12-18 NOTE — TELEPHONE ENCOUNTER
Reason for Disposition   [1] COVID-19 diagnosed by positive lab test (e.g., PCR, rapid self-test kit) AND [2] mild symptoms (e.g., cough, fever, others) AND [3] no complications or SOB    Additional Information   Negative: SEVERE difficulty breathing (e.g., struggling for each breath, speaks in single words)   Negative: Difficult to awaken or acting confused (e.g., disoriented, slurred speech)   Negative: Bluish (or gray) lips or face now   Negative: Shock suspected (e.g., cold/pale/clammy skin, too weak to stand, low BP, rapid pulse)   Negative: Sounds like a life-threatening emergency to the triager   Negative: [1] Diagnosed or suspected COVID-19 AND [2] symptoms lasting 3 or more weeks   Negative: [1] COVID-19 exposure AND [2] no symptoms   Negative: COVID-19 vaccine reaction suspected (e.g., fever, headache, muscle aches) occurring 1 to 3 days after getting vaccine   Negative: COVID-19 vaccine, questions about   Negative: [1] Lives with someone known to have influenza (flu test positive) AND [2] flu-like symptoms (e.g., cough, runny nose, sore throat, SOB; with or without fever)   Negative: [1] Possible COVID-19 symptoms AND [2] triager concerned about severity of symptoms or other causes   Negative: COVID-19 and breastfeeding, questions about   Negative: SEVERE or constant chest pain or pressure  (Exception: Mild central chest pain, present only when coughing.)   Negative: MODERATE difficulty breathing (e.g., speaks in phrases, SOB even at rest, pulse 100-120)   Negative: [1] Headache AND [2] stiff neck (can't touch chin to chest)   Negative: Oxygen level (e.g., pulse oximetry) 90 percent or lower   Negative: Chest pain or pressure  (Exception: MILD central chest pain, present only when coughing.)   Negative: [1] Drinking very little AND [2] dehydration suspected (e.g., no urine > 12 hours, very dry mouth, very lightheaded)   Negative: Patient sounds very sick or weak to the triager   Negative: MILD difficulty  "breathing (e.g., minimal/no SOB at rest, SOB with walking, pulse <100)   Negative: Fever > 103 F (39.4 C)   Negative: [1] Fever > 101 F (38.3 C) AND [2] age > 60 years   Negative: [1] Fever > 100.0 F (37.8 C) AND [2] bedridden (e.g., CVA, chronic illness, recovering from surgery)   Negative: Oxygen level (e.g., pulse oximetry) 91 to 94 percent   Negative: [1] HIGH RISK patient (e.g., weak immune system, age > 64 years, obesity with BMI 30 or higher, pregnant, chronic lung disease or other chronic medical condition) AND [2] COVID symptoms (e.g., cough, fever)  (Exceptions: Already seen by PCP and no new or worsening symptoms.)   Negative: [1] HIGH RISK patient AND [2] influenza is widespread in the community AND [3] ONE OR MORE respiratory symptoms: cough, sore throat, runny or stuffy nose   Negative: [1] HIGH RISK patient AND [2] influenza exposure within the last 7 days AND [3] ONE OR MORE respiratory symptoms: cough, sore throat, runny or stuffy nose   Negative: Fever present > 3 days (72 hours)   Negative: [1] Fever returns after gone for over 24 hours AND [2] symptoms worse or not improved   Negative: [1] Continuous (nonstop) coughing interferes with work or school AND [2] no improvement using cough treatment per Care Advice   Negative: [1] COVID-19 infection suspected by caller or triager AND [2] mild symptoms (cough, fever, or others) AND [3] negative COVID-19 rapid test   Negative: Cough present > 3 weeks    Answer Assessment - Initial Assessment Questions  1. COVID-19 DIAGNOSIS: \"How do you know that you have COVID?\" (e.g., positive lab test or self-test, diagnosed by doctor or NP/PA, symptoms after exposure).      Positive test today  2. COVID-19 EXPOSURE: \"Was there any known exposure to COVID before the symptoms began?\" CDC Definition of close contact: within 6 feet (2 meters) for a total of 15 minutes or more over a 24-hour period.       no  3. ONSET: \"When did the COVID-19 symptoms start?\"       " "12/13/23  4. WORST SYMPTOM: \"What is your worst symptom?\" (e.g., cough, fever, shortness of breath, muscle aches)      Dizziness, vomiting x 1, fatigue  5. COUGH: \"Do you have a cough?\" If Yes, ask: \"How bad is the cough?\"        mild  6. FEVER: \"Do you have a fever?\" If Yes, ask: \"What is your temperature, how was it measured, and when did it start?\"      Yes - no thermometer  7. RESPIRATORY STATUS: \"Describe your breathing?\" (e.g., normal; shortness of breath, wheezing, unable to speak)       OK, but she is tired - states no difficulty breathing  8. BETTER-SAME-WORSE: \"Are you getting better, staying the same or getting worse compared to yesterday?\"  If getting worse, ask, \"In what way?\"      Weaker today  9. OTHER SYMPTOMS: \"Do you have any other symptoms?\"  (e.g., chills, fatigue, headache, loss of smell or taste, muscle pain, sore throat)      Eating little but drinking fluids - last urinated 1 hour ago  10. HIGH RISK DISEASE: \"Do you have any chronic medical problems?\" (e.g., asthma, heart or lung disease, weak immune system, obesity, etc.)        no  11. VACCINE: \"Have you had the COVID-19 vaccine?\" If Yes, ask: \"Which one, how many shots, when did you get it?\"        no  12. PREGNANCY: \"Is there any chance you are pregnant?\" \"When was your last menstrual period?\"        no  13. O2 SATURATION MONITOR:  \"Do you use an oxygen saturation monitor (pulse oximeter) at home?\" If Yes, ask \"What is your reading (oxygen level) today?\" \"What is your usual oxygen saturation reading?\" (e.g., 95%)        None at home    Protocols used: Coronavirus (COVID-19) Diagnosed or [id number]    "

## 2023-12-18 NOTE — TELEPHONE ENCOUNTER
COVID Positive/Requesting COVID treatment    Patient is positive for COVID and requesting treatment options.    Date of positive COVID test (PCR or at home)? yes  Current COVID symptoms: fever or chills, fatigue, headache, and nausea or vomiting  Date COVID symptoms began: 12/14/2023    Message should be routed to clinic RN pool. Best phone number to use for call back: [phone]

## 2024-02-01 ENCOUNTER — OFFICE VISIT (OUTPATIENT)
Dept: FAMILY MEDICINE | Facility: CLINIC | Age: 49
End: 2024-02-01
Payer: COMMERCIAL

## 2024-02-01 VITALS
TEMPERATURE: 97.9 F | BODY MASS INDEX: 25.61 KG/M2 | HEART RATE: 76 BPM | OXYGEN SATURATION: 98 % | WEIGHT: 126.8 LBS | SYSTOLIC BLOOD PRESSURE: 113 MMHG | DIASTOLIC BLOOD PRESSURE: 77 MMHG | RESPIRATION RATE: 18 BRPM

## 2024-02-01 DIAGNOSIS — H92.03 OTALGIA, BILATERAL: ICD-10-CM

## 2024-02-01 DIAGNOSIS — G44.209 TENSION HEADACHE: Primary | ICD-10-CM

## 2024-02-01 PROCEDURE — 99204 OFFICE O/P NEW MOD 45 MIN: CPT | Performed by: NURSE PRACTITIONER

## 2024-02-01 ASSESSMENT — ENCOUNTER SYMPTOMS
CHILLS: 0
FEVER: 0
DIZZINESS: 0
NAUSEA: 1
COUGH: 0
WEAKNESS: 0

## 2024-02-01 NOTE — PATIENT INSTRUCTIONS
I did trigger point injections today to help with your muscle spasming in your neck.    These areas may be a little sore, but hopefully your head and neck will improve today.    May take Tylenol or ibuprofen as needed.  Apply heat or ice to your neck, whichever feels better.  Rest today.    Tylenol 1000 mg 3 times daily and/or ibuprofen 600 mg 3 times daily. Try Tylenol first.      Recommend physical therapy for your neck pain.  Someone will call you to set that up.    Sometimes you can feel pain in the ears as well from tension.  This may be related.    You can discuss your mild hearing loss at your February 23 appointment to see if it is still there.    Your ears look normal today.

## 2024-02-01 NOTE — PROCEDURES
Trigger point injection(s)    Locations of trigger point(s) left and right superior paracervical muscles and left upper trapezius.  See diagram.    Discussed with patient risk and benefit of procedure.     Area cleaned with alcohol swab and injected with 1 mL of lidocaine into each identified trigger point using 27-gauge needle.  Patient tolerated procedure without immediate complication.  Band-Aids applied.    Patient reported the most symptoms with the right upper trigger point which caused significant pain to shoot into the head.

## 2024-02-01 NOTE — PROGRESS NOTES
Assessment & Plan     Tension headache    - Physical Therapy Referral    Otalgia, bilateral         Pain in both ears with normal ear exam today.  Possible mild decrease in hearing per her report.  This is ongoing for at least 2 months.    Complaining of several times weekly daily headaches across the top of the head associated with neck pain, a little dizziness and nausea.  This has caused patient a lot of disability over the last 2 years.  Not addressed this anywhere other than at a massage therapist.  It sounds like the massage therapist told her she had a lot of muscle spasms.  On exam, she does have multiple trigger points identified that cause pain to shoot into the head.  Explained tension headaches.    Recommended and explained trigger point injections and patient is willing to try.  Did do 3 trigger point injections today.  Patient held for 15 minutes and rechecked.    She says her headache may be slightly better, rated 4 out of 10, but all of her other associated symptoms including most of her ear pain, nausea, dizziness all went away and she feels much better after the trigger point injections overall.  She has some difficulty with the pain scale or explaining how she is feeling, but she indicates at least two thirds better.    Recommend ice alternating with heat, Tylenol or ibuprofen, PT.    Recheck with PCP on the 23rd as scheduled.  45 minutes spent by me on the date of the encounter doing chart review, patient visit, documentation, and trigger point injections x 3          No follow-ups on file.    [facility] MICHAEL Landeros is a 48 year old female who presents to clinic today for the following health issues:  Chief Complaint   Patient presents with    Ear Problem     Both ears, started 2 months,no pressure in ear, ongoing headaches x years, no fever     HPI    Left greater than right ear pain starting 1 month.  Comes and goes.  Last night pain  was 6/10.  Right now pain is 4/10.      Pain on top of entire head.  Tension headache hx. Comes and goes for 2 years, every other day.  Saw PCP for same 2 years ago.  Hasn't seen anyone since other than a massage therapist.        Pain in neck.  No injury.      Has had a little dizziness and 1 episode of vomiting this morning that sometimes occur with the headaches.    Due to language barrier, an  was present during the history-taking and subsequent discussion (and for part of the physical exam) with this patient.        Review of Systems   Constitutional:  Negative for chills and fever.   HENT:  Positive for hearing loss. Negative for congestion.    Respiratory:  Negative for cough.    Gastrointestinal:  Positive for nausea.   Neurological:  Negative for dizziness and weakness.           Objective    /77 (BP Location: Right arm, Patient Position: Sitting, Cuff Size: Adult Regular)   Pulse 76   Temp 97.9  F (36.6  C) (Oral)   Resp 18   Wt 57.5 kg (126 lb 12.8 oz)   SpO2 98%   BMI 25.61 kg/m    Physical Exam  Constitutional:       Appearance: Normal appearance.   HENT:      Right Ear: Tympanic membrane normal.      Left Ear: Tympanic membrane normal.   Eyes:      Conjunctiva/sclera: Conjunctivae normal.   Neck:        Comments: 3 trigger points identified that causes pain to shoot into the head.    Generally tender on the upper back and neck area.  Pulmonary:      Effort: Pulmonary effort is normal.   Musculoskeletal:         General: Tenderness (Multiple trigger points on neck and upper back.) present.      Cervical back: No rigidity.   Neurological:      Mental Status: She is alert and oriented to person, place, and time.      Motor: No weakness.   Psychiatric:         Mood and Affect: Mood normal.

## 2024-02-23 ENCOUNTER — ORDERS ONLY (AUTO-RELEASED) (OUTPATIENT)
Dept: FAMILY MEDICINE | Facility: CLINIC | Age: 49
End: 2024-02-23

## 2024-02-23 ENCOUNTER — OFFICE VISIT (OUTPATIENT)
Dept: FAMILY MEDICINE | Facility: CLINIC | Age: 49
End: 2024-02-23
Payer: COMMERCIAL

## 2024-02-23 VITALS
TEMPERATURE: 98.5 F | DIASTOLIC BLOOD PRESSURE: 77 MMHG | SYSTOLIC BLOOD PRESSURE: 116 MMHG | HEIGHT: 59 IN | HEART RATE: 68 BPM | WEIGHT: 127.3 LBS | RESPIRATION RATE: 16 BRPM | OXYGEN SATURATION: 100 % | BODY MASS INDEX: 25.66 KG/M2

## 2024-02-23 DIAGNOSIS — Z12.11 SCREEN FOR COLON CANCER: ICD-10-CM

## 2024-02-23 DIAGNOSIS — Z12.31 VISIT FOR SCREENING MAMMOGRAM: ICD-10-CM

## 2024-02-23 DIAGNOSIS — Z00.00 ROUTINE GENERAL MEDICAL EXAMINATION AT A HEALTH CARE FACILITY: Primary | ICD-10-CM

## 2024-02-23 DIAGNOSIS — G43.711 INTRACTABLE CHRONIC MIGRAINE WITHOUT AURA AND WITH STATUS MIGRAINOSUS: ICD-10-CM

## 2024-02-23 DIAGNOSIS — Z13.29 SCREENING FOR THYROID DISORDER: ICD-10-CM

## 2024-02-23 DIAGNOSIS — Z13.0 SCREENING FOR DEFICIENCY ANEMIA: ICD-10-CM

## 2024-02-23 DIAGNOSIS — Z97.5 IUD (INTRAUTERINE DEVICE) IN PLACE: ICD-10-CM

## 2024-02-23 DIAGNOSIS — Z13.220 SCREENING FOR LIPID DISORDERS: ICD-10-CM

## 2024-02-23 DIAGNOSIS — N87.0 DYSPLASIA OF CERVIX, LOW GRADE (CIN 1): ICD-10-CM

## 2024-02-23 DIAGNOSIS — Z13.228 SCREENING FOR METABOLIC DISORDER: ICD-10-CM

## 2024-02-23 DIAGNOSIS — Z12.4 CERVICAL CANCER SCREENING: ICD-10-CM

## 2024-02-23 PROBLEM — Z30.430 ENCOUNTER FOR IUD INSERTION: Status: RESOLVED | Noted: 2019-03-22 | Resolved: 2024-02-23

## 2024-02-23 PROBLEM — G44.229 CHRONIC TENSION-TYPE HEADACHE, NOT INTRACTABLE: Status: ACTIVE | Noted: 2024-02-23

## 2024-02-23 PROBLEM — G44.229 CHRONIC TENSION-TYPE HEADACHE, NOT INTRACTABLE: Status: RESOLVED | Noted: 2024-02-23 | Resolved: 2024-02-23

## 2024-02-23 LAB
CHOLEST SERPL-MCNC: 151 MG/DL
ERYTHROCYTE [DISTWIDTH] IN BLOOD BY AUTOMATED COUNT: 12 % (ref 10–15)
FASTING STATUS PATIENT QL REPORTED: YES
HCT VFR BLD AUTO: 40 % (ref 35–47)
HDLC SERPL-MCNC: 59 MG/DL
HGB BLD-MCNC: 13.2 G/DL (ref 11.7–15.7)
LDLC SERPL CALC-MCNC: 79 MG/DL
MCH RBC QN AUTO: 32.7 PG (ref 26.5–33)
MCHC RBC AUTO-ENTMCNC: 33 G/DL (ref 31.5–36.5)
MCV RBC AUTO: 99 FL (ref 78–100)
NONHDLC SERPL-MCNC: 92 MG/DL
PLATELET # BLD AUTO: 210 10E3/UL (ref 150–450)
RBC # BLD AUTO: 4.04 10E6/UL (ref 3.8–5.2)
TRIGL SERPL-MCNC: 65 MG/DL
TSH SERPL DL<=0.005 MIU/L-ACNC: 0.51 UIU/ML (ref 0.3–4.2)
WBC # BLD AUTO: 5.4 10E3/UL (ref 4–11)

## 2024-02-23 PROCEDURE — 84443 ASSAY THYROID STIM HORMONE: CPT

## 2024-02-23 PROCEDURE — 85027 COMPLETE CBC AUTOMATED: CPT

## 2024-02-23 PROCEDURE — G0124 SCREEN C/V THIN LAYER BY MD: HCPCS | Performed by: PATHOLOGY

## 2024-02-23 PROCEDURE — 87624 HPV HI-RISK TYP POOLED RSLT: CPT

## 2024-02-23 PROCEDURE — 99396 PREV VISIT EST AGE 40-64: CPT | Mod: 25

## 2024-02-23 PROCEDURE — 91320 SARSCV2 VAC 30MCG TRS-SUC IM: CPT

## 2024-02-23 PROCEDURE — 36415 COLL VENOUS BLD VENIPUNCTURE: CPT

## 2024-02-23 PROCEDURE — 90480 ADMN SARSCOV2 VAC 1/ONLY CMP: CPT

## 2024-02-23 PROCEDURE — G0145 SCR C/V CYTO,THINLAYER,RESCR: HCPCS

## 2024-02-23 PROCEDURE — 90686 IIV4 VACC NO PRSV 0.5 ML IM: CPT

## 2024-02-23 PROCEDURE — 90471 IMMUNIZATION ADMIN: CPT

## 2024-02-23 PROCEDURE — 99213 OFFICE O/P EST LOW 20 MIN: CPT | Mod: 25

## 2024-02-23 PROCEDURE — 80061 LIPID PANEL: CPT

## 2024-02-23 RX ORDER — PROPRANOLOL HYDROCHLORIDE 20 MG/1
20 TABLET ORAL 2 TIMES DAILY
Qty: 180 TABLET | Refills: 1 | Status: SHIPPED | OUTPATIENT
Start: 2024-02-23

## 2024-02-23 RX ORDER — SUMATRIPTAN 50 MG/1
50 TABLET, FILM COATED ORAL
Qty: 12 TABLET | Refills: 1 | Status: SHIPPED | OUTPATIENT
Start: 2024-02-23

## 2024-02-23 SDOH — HEALTH STABILITY: PHYSICAL HEALTH: ON AVERAGE, HOW MANY DAYS PER WEEK DO YOU ENGAGE IN MODERATE TO STRENUOUS EXERCISE (LIKE A BRISK WALK)?: 7 DAYS

## 2024-02-23 ASSESSMENT — SOCIAL DETERMINANTS OF HEALTH (SDOH): HOW OFTEN DO YOU GET TOGETHER WITH FRIENDS OR RELATIVES?: ONCE A WEEK

## 2024-02-23 NOTE — PROGRESS NOTES
Preventive Care Visit  [facility]  MARITZA Tobar CNP, Family Medicine  Feb 23, 2024      Assessment & Plan   Problem List Items Addressed This Visit       Dysplasia of cervix, low grade (ARMIDA 1)     Patient with ASCUS and +HPV 16 in 2019; had colposcopy at that time which correlated with the PAP. She did not follow up in one year as instructed for repeat PAP/HPV and this was done today. Will plan to follow up on results.         Intractable chronic migraine without aura and with status migrainosus     Patient is describing symptoms consistent with chronic migraine in that she is experiencing almost daily, unilateral throbbing headaches associated with light sensitivity and nausea. She has no alarm findings, such as thunderclap headaches, positional headaches, or any neurological changes associated with these headaches. She is neurologically intact today in clinic. Based on her frequency, she is an excellent candidate for preventative therapy. Will plan to initiate propranolol 20mg BID with a plan to titrate up if needed. Will also trial Imitrex for abortive therapy. Proper administration, expected therapeutic effects and potential side effects were discussed today. Follow up in 3 months regarding efficacy or sooner if needed.         Relevant Medications    propranolol (INDERAL) 20 MG tablet    SUMAtriptan (IMITREX) 50 MG tablet    Routine general medical examination at a health care facility - Primary     Annual exam. New patient. Some language barrier, but  assisted as needed with translation. Declined medical interpretor.   Mammogram: Never completed. Ordered.  Colonoscopy: Amenable to Cologuard. Order placed.  PAP: Updated today.  Immunizations: COVID and influenza today  Labs: Discussed and offered.  Mood: Stable  BMI: 25.71. Discussed diet and exercise.  Discussed bone health. No indication for early DEXA  Contraception: Paraguard.   STI screenings: Declined.  Follow up  "in one year for annual exam or sooner if needed/indicated.         IUD (intrauterine device) in place     Paraguard placed in 2019. Continues with normal menstrual cycles.          Other Visit Diagnoses       Visit for screening mammogram        Relevant Orders    MA SCREENING DIGITAL BILAT - Future  (s+30)    Screen for colon cancer        Relevant Orders    COLOGUARD([employer])    Cervical cancer screening        Relevant Orders    Pap Screen with HPV - recommended age 30 - 65 years    Screening for metabolic disorder        Relevant Orders    Basic metabolic panel  (Ca, Cl, CO2, Creat, Gluc, K, Na, BUN)    Screening for deficiency anemia        Relevant Orders    CBC with platelets (Completed)    Screening for lipid disorders        Relevant Orders    Lipid panel reflex to direct LDL Fasting    Screening for thyroid disorder        Relevant Orders    TSH with free T4 reflex           BMI  Estimated body mass index is 25.71 kg/m  as calculated from the following:    Height as of this encounter: 1.499 m (4' 11\").    Weight as of this encounter: 57.7 kg (127 lb 4.8 oz).     Weight management plan: Discussed healthy diet and exercise guidelines    Counseling  Appropriate preventive services were discussed with this patient, including applicable screening as appropriate for fall prevention, nutrition, physical activity, Tobacco-use cessation, weight loss and cognition.  Checklist reviewing preventive services available has been given to the patient.  Reviewed patient's diet, addressing concerns and/or questions.     Rosmery Landeros is a 48 year old, presenting for the following:  Physical        2/23/2024     8:13 AM   Additional Questions   Roomed by ac   Accompanied by Daughter and       Health Care Directive  Patient does not have a Health Care Directive or Living Will: Discussed advance care planning with patient; however, patient declined at this time.    In addition to preventative medicine, she " would like to discuss headaches. She has struggled with these for a number of years (5-7 years). They seemed to be more migraine like in the past, but now they seem more tension. She is experiencing headaches almost every day. The pain will sometimes be all over, sometimes be on one side or the other. Very sharp, pounding. She feels like they needs to lie down and sleep or massage the area when this happens. She will have accompanied nausea and light sensitivity. She will take Tylenol and migraine medication. Her headaches will sometimes resolve with this. She is not having any neurologic symptoms. She has never been evaluated/seen for this medication.           2/23/2024   General Health   How would you rate your overall physical health? (!) POOR   Feel stress (tense, anxious, or unable to sleep) Not at all         2/23/2024   Nutrition   Three or more servings of calcium each day? Yes   Diet: Regular (no restrictions)   How many servings of fruit and vegetables per day? (!) 2-3   How many sweetened beverages each day? 0-1         2/23/2024   Exercise   Days per week of moderate/strenous exercise 7 days         2/23/2024   Social Factors   Frequency of gathering with friends or relatives Once a week   Worry food won't last until get money to buy more No   Food not last or not have enough money for food? No   Do you have housing?  Yes   Are you worried about losing your housing? No   Lack of transportation? No   Unable to get utilities (heat,electricity)? No         2/23/2024   Dental   Dentist two times every year? Yes         2/23/2024   TB Screening   Were you born outside of [place]?  (!) YES         Today's PHQ-2 Score:       2/23/2024     8:21 AM   PHQ-2 ( 1999 Pf[employer])   Q1: Little interest or pleasure in doing things 0   Q2: Feeling down, depressed or hopeless 0   PHQ-2 Score 0   Q1: Little interest or pleasure in doing things Not at all   Q2: Feeling down, depressed or hopeless Not at all   PHQ-2 Score 0           " 2/23/2024   Substance Use   Alcohol more than 3/day or more than 7/wk No   Do you use any other substances recreationally? No     Social History     Tobacco Use    Smoking status: Never    Smokeless tobacco: Never   Vaping Use    Vaping Use: Never used   Substance Use Topics    Alcohol use: No    Drug use: No             2/23/2024   Breast Cancer Screening   Family history of breast, colon, or ovarian cancer? No / Unknown      Mammogram Screening - Mammogram every 1-2 years updated in Health Maintenance based on mutual decision making        2/23/2024   STI Screening   New sexual partner(s) since last STI/HIV test? No     History of abnormal Pap smear: YES - updated in Problem List and Health Maintenance accordingly        Latest Ref Rng & Units 1/21/2019     3:49 PM   PAP / HPV   PAP Negative for squamous intraepithelial lesion or malignancy. Atypical squamous cells of undetermined significance  Electronically signed by Bharat Nguyen MD on 1/29/2019 at 12:25 PM      HPV 16 DNA NEG Positive    HPV 18 DNA NEG Negative    Other HR HPV NEG Negative      ASCVD Risk   The 10-year ASCVD risk score (Micah DAIGLE, et al., 2019) is: 0.6%    Values used to calculate the score:      Age: 48 years      Sex: Female      Is Non- : No      Diabetic: No      Tobacco smoker: No      Systolic Blood Pressure: 116 mmHg      Is BP treated: No      HDL Cholesterol: 51 mg/dL      Total Cholesterol: 173 mg/dL        2/23/2024   Contraception/Family Planning   Questions about contraception or family planning No          Reviewed and updated as needed this visit by Provider   Tobacco  Allergies  Meds  Problems  Med Hx  Surg Hx  Fam Hx             Objective    Exam  /77 (BP Location: Left arm, Patient Position: Sitting, Cuff Size: Adult Regular)   Pulse 68   Temp 98.5  F (36.9  C) (Oral)   Resp 16   Ht 1.499 m (4' 11\")   Wt 57.7 kg (127 lb 4.8 oz)   LMP 02/12/2024   SpO2 100%   BMI " "25.71 kg/m     Estimated body mass index is 25.71 kg/m  as calculated from the following:    Height as of this encounter: 1.499 m (4' 11\").    Weight as of this encounter: 57.7 kg (127 lb 4.8 oz).    Physical Exam  GENERAL: alert and no distress  EYES: Eyes grossly normal to inspection, PERRL and conjunctivae and sclerae normal  HENT: ear canals and TM's normal, nose and mouth without ulcers or lesions  NECK: no adenopathy, no asymmetry, masses, or scars  RESP: lungs clear to auscultation - no rales, rhonchi or wheezes  BREAST: normal without masses, tenderness or nipple discharge and no palpable axillary masses or adenopathy  CV: regular rate and rhythm, normal S1 S2, no S3 or S4, no murmur, click or rub, no peripheral edema  ABDOMEN: soft, nontender, no hepatosplenomegaly, no masses and bowel sounds normal  : normal female external genitalia, normal urethral meatus, normal vaginal mucosa. Cervix with areas of light discoloration and small amount of yellow discharge.   MS: no gross musculoskeletal defects noted, no edema  SKIN: no suspicious lesions or rashes  NEURO: Normal strength and tone, mentation intact and speech normal  PSYCH: mentation appears normal, affect normal/bright    Signed Electronically by: MARITZA Tobar CNP    "

## 2024-02-23 NOTE — ASSESSMENT & PLAN NOTE
Patient with ASCUS and +HPV 16 in 2019; had colposcopy at that time which correlated with the PAP. She did not follow up in one year as instructed for repeat PAP/HPV and this was done today. Will plan to follow up on results.

## 2024-02-23 NOTE — ASSESSMENT & PLAN NOTE
Annual exam. New patient. Some language barrier, but  assisted as needed with translation. Declined medical interpretor.   Mammogram: Never completed. Ordered.  Colonoscopy: Amenable to Cologuard. Order placed.  PAP: Updated today.  Immunizations: COVID and influenza today  Labs: Discussed and offered.  Mood: Stable  BMI: 25.71. Discussed diet and exercise.  Discussed bone health. No indication for early DEXA  Contraception: Paraguard.   STI screenings: Declined.  Follow up in one year for annual exam or sooner if needed/indicated.

## 2024-02-23 NOTE — ASSESSMENT & PLAN NOTE
Patient is describing symptoms consistent with chronic migraine in that she is experiencing almost daily, unilateral throbbing headaches associated with light sensitivity and nausea. She has no alarm findings, such as thunderclap headaches, positional headaches, or any neurological changes associated with these headaches. She is neurologically intact today in clinic. Based on her frequency, she is an excellent candidate for preventative therapy. Will plan to initiate propranolol 20mg BID with a plan to titrate up if needed. Will also trial Imitrex for abortive therapy. Proper administration, expected therapeutic effects and potential side effects were discussed today. Follow up in 3 months regarding efficacy or sooner if needed.

## 2024-02-23 NOTE — PATIENT INSTRUCTIONS
For headaches:  -We will start propranolol, 20mg twice daily. This has to be taken for 2-3 months to see whether or not it will be helpful  -Use Imitrex for migraine headaches as needed. No more than 8 times a month.   -See the attached information  Follow up with your ear specialist as discussed  Preventive Care Advice   This is general advice given by our system to help you stay healthy. However, your care team may have specific advice just for you. Please talk to your care team about your preventive care needs.  Nutrition  Eat 5 or more servings of fruits and vegetables each day.  Try wheat bread, brown rice and whole grain pasta (instead of white bread, rice, and pasta).  Get enough calcium and vitamin D. Check the label on foods and aim for 100% of the RDA (recommended daily allowance).  Lifestyle  Exercise at least 150 minutes each week  (30 minutes a day, 5 days a week).  Do muscle strengthening activities 2 days a week. These help control your weight and prevent disease.  No smoking.  Wear sunscreen to prevent skin cancer.  Have a dental exam and cleaning every 6 months.  Yearly exams  See your health care team every year to talk about:  Any changes in your health.  Any medicines your care team has prescribed.  Preventive care, family planning, and ways to prevent chronic diseases.  Shots (vaccines)   HPV shots (up to age 26), if you've never had them before.  Hepatitis B shots (up to age 59), if you've never had them before.  COVID-19 shot: Get this shot when it's due.  Flu shot: Get a flu shot every year.  Tetanus shot: Get a tetanus shot every 10 years.  Pneumococcal, hepatitis A, and RSV shots: Ask your care team if you need these based on your risk.  Shingles shot (for age 50 and up)  General health tests  Diabetes screening:  Starting at age 35, Get screened for diabetes at least every 3 years.  If you are younger than age 35, ask your care team if you should be screened for diabetes.  Cholesterol  test: At age 39, start having a cholesterol test every 5 years, or more often if advised.  Bone density scan (DEXA): At age 50, ask your care team if you should have this scan for osteoporosis (brittle bones).  Hepatitis C: Get tested at least once in your life.  STIs (sexually transmitted infections)  Before age 24: Ask your care team if you should be screened for STIs.  After age 24: Get screened for STIs if you're at risk. You are at risk for STIs (including HIV) if:  You are sexually active with more than one person.  You don't use condoms every time.  You or a partner was diagnosed with a sexually transmitted infection.  If you are at risk for HIV, ask about PrEP medicine to prevent HIV.  Get tested for HIV at least once in your life, whether you are at risk for HIV or not.  Cancer screening tests  Cervical cancer screening: If you have a cervix, begin getting regular cervical cancer screening tests starting at age 21.  Breast cancer scan (mammogram): If you've ever had breasts, begin having regular mammograms starting at age 40. This is a scan to check for breast cancer.  Colon cancer screening: It is important to start screening for colon cancer at age 45.  Have a colonoscopy test every 10 years (or more often if you're at risk) Or, ask your provider about stool tests like a FIT test every year or Cologuard test every 3 years.  To learn more about your testing options, visit:   [email or web address].  For help making a decision, visit:   https://bit.ly/uq50802.  Prostate cancer screening test: If you have a prostate, ask your care team if a prostate cancer screening test (PSA) at age 55 is right for you.  Lung cancer screening: If you are a current or former smoker ages 50 to 80, ask your care team if ongoing lung cancer screenings are right for you.  For informational purposes only. Not to replace the advice of your health care provider. Copyright   2023 [employer]. All rights  reserved. Clinically reviewed by the [facility]. [employer] [id number] - REV 01/24.

## 2024-02-26 ENCOUNTER — TELEPHONE (OUTPATIENT)
Dept: FAMILY MEDICINE | Facility: CLINIC | Age: 49
End: 2024-02-26
Payer: COMMERCIAL

## 2024-02-26 NOTE — TELEPHONE ENCOUNTER
----- Message from MARITZA Tobar CNP sent at 2/24/2024  3:49 PM CST -----  Please call patient and let her know that all of her labs have returned within normal range. Her PAP is still pending. Thank you!   MARITZA Tobar CNP on 2/24/2024 at 3:49 PM

## 2024-03-04 LAB
BKR LAB AP GYN ADEQUACY: ABNORMAL
BKR LAB AP GYN INTERPRETATION: ABNORMAL
BKR LAB AP HPV REFLEX: ABNORMAL
BKR LAB AP PREVIOUS ABNL DX: ABNORMAL
BKR LAB AP PREVIOUS ABNORMAL: ABNORMAL
PATH REPORT.COMMENTS IMP SPEC: ABNORMAL
PATH REPORT.COMMENTS IMP SPEC: ABNORMAL
PATH REPORT.RELEVANT HX SPEC: ABNORMAL

## 2024-03-05 LAB
HUMAN PAPILLOMA VIRUS 16 DNA: POSITIVE
HUMAN PAPILLOMA VIRUS 18 DNA: NEGATIVE
HUMAN PAPILLOMA VIRUS FINAL DIAGNOSIS: ABNORMAL
HUMAN PAPILLOMA VIRUS OTHER HR: POSITIVE

## 2024-03-06 PROBLEM — R87.613 HSIL ON PAP SMEAR OF CERVIX: Status: ACTIVE | Noted: 2024-03-06

## 2024-03-07 ENCOUNTER — PATIENT OUTREACH (OUTPATIENT)
Dept: FAMILY MEDICINE | Facility: CLINIC | Age: 49
End: 2024-03-07
Payer: COMMERCIAL

## 2024-03-07 DIAGNOSIS — R87.613 HSIL ON PAP SMEAR OF CERVIX: Primary | ICD-10-CM

## 2024-03-07 NOTE — TELEPHONE ENCOUNTER
02/23/24 HSIL Pap encompassing mod/severe dysplasia, CIS, CIN2, CIN3, +HR HPV types 16 and other  Plan Horatio and consult with Ob/gyn due bef 05/23/24.

## 2024-07-15 ENCOUNTER — TELEPHONE (OUTPATIENT)
Dept: FAMILY MEDICINE | Facility: CLINIC | Age: 49
End: 2024-07-15

## 2024-07-15 NOTE — TELEPHONE ENCOUNTER
Patient Quality Outreach    Patient is due for the following:   Colon Cancer Screening  Breast Cancer Screening - Mammogram    Next Steps:   No follow up needed at this time.    Type of outreach:    Sent [employer] message.      Questions for provider review:    None           Torri Luna MA

## 2024-07-20 ENCOUNTER — HEALTH MAINTENANCE LETTER (OUTPATIENT)
Age: 49
End: 2024-07-20

## 2024-09-05 ENCOUNTER — TELEPHONE (OUTPATIENT)
Dept: FAMILY MEDICINE | Facility: CLINIC | Age: 49
End: 2024-09-05

## 2024-09-05 NOTE — TELEPHONE ENCOUNTER
Spoke with patient to see if she has had any abnormal pap follow up anywhere and she sates no too busy still and that she will call later and schedule. Patient notified it is important to do follow up. Patient states OK will call later to schedule.

## 2024-09-25 NOTE — TELEPHONE ENCOUNTER
John Hernandez,   Patient is lost to pap tracking follow-up. Attempts to contact pt have been made per reminder process and there has been no reply and/or no appt scheduled.     Pap Hx:  01/21/19 ASCUS Pap, +HR HPV types 16  02/14/19 Marcellus Bx ARMIDA 1    02/23/24 HSIL Pap encompassing mod/severe dysplasia, CIS, CIN2, CIN3, +HR HPV types 16 and other  Plan Marcellus and consult with Ob/gyn due bef 05/23/24 03/7/24 Pt was advised.  04/24/24 Reminder Letter (2mo)  5/17/24 Marcellus not done. Tracking updated for 6 mo colp/pap due 8/23/24.    8/2/24 Reminder mychart not viewed  09/5/24 Reminder call-lm (6mo)  09/25/24 Lost to follow-up for pap tracking

## 2025-01-08 ENCOUNTER — PATIENT OUTREACH (OUTPATIENT)
Dept: CARE COORDINATION | Facility: CLINIC | Age: 50
End: 2025-01-08
Payer: COMMERCIAL

## 2025-04-06 ENCOUNTER — HEALTH MAINTENANCE LETTER (OUTPATIENT)
Age: 50
End: 2025-04-06